# Patient Record
Sex: FEMALE | Race: WHITE | NOT HISPANIC OR LATINO | Employment: OTHER | URBAN - METROPOLITAN AREA
[De-identification: names, ages, dates, MRNs, and addresses within clinical notes are randomized per-mention and may not be internally consistent; named-entity substitution may affect disease eponyms.]

---

## 2018-02-04 ENCOUNTER — HOSPITAL ENCOUNTER (EMERGENCY)
Facility: HOSPITAL | Age: 38
Discharge: HOME | End: 2018-02-04
Attending: EMERGENCY MEDICINE | Admitting: EMERGENCY MEDICINE

## 2018-02-04 ENCOUNTER — APPOINTMENT (OUTPATIENT)
Dept: GENERAL RADIOLOGY | Facility: HOSPITAL | Age: 38
End: 2018-02-04

## 2018-02-04 VITALS
WEIGHT: 150 LBS | HEART RATE: 70 BPM | RESPIRATION RATE: 18 BRPM | DIASTOLIC BLOOD PRESSURE: 54 MMHG | TEMPERATURE: 98.4 F | BODY MASS INDEX: 24.99 KG/M2 | HEIGHT: 65 IN | SYSTOLIC BLOOD PRESSURE: 99 MMHG | OXYGEN SATURATION: 99 %

## 2018-02-04 DIAGNOSIS — M94.0 COSTOCHONDRITIS: Primary | ICD-10-CM

## 2018-02-04 LAB
ALBUMIN SERPL-MCNC: 4.1 G/DL (ref 3.5–5.2)
ALBUMIN/GLOB SERPL: 2.1 G/DL
ALP SERPL-CCNC: 40 U/L (ref 39–117)
ALT SERPL W P-5'-P-CCNC: 9 U/L (ref 1–33)
ANION GAP SERPL CALCULATED.3IONS-SCNC: 11.6 MMOL/L
AST SERPL-CCNC: 13 U/L (ref 1–32)
BASOPHILS # BLD AUTO: 0.02 10*3/MM3 (ref 0–0.2)
BASOPHILS NFR BLD AUTO: 0.3 % (ref 0–1.5)
BILIRUB SERPL-MCNC: 0.2 MG/DL (ref 0.1–1.2)
BUN BLD-MCNC: 17 MG/DL (ref 6–20)
BUN/CREAT SERPL: 21.8 (ref 7–25)
CALCIUM SPEC-SCNC: 8.8 MG/DL (ref 8.6–10.5)
CHLORIDE SERPL-SCNC: 107 MMOL/L (ref 98–107)
CO2 SERPL-SCNC: 23.4 MMOL/L (ref 22–29)
CREAT BLD-MCNC: 0.78 MG/DL (ref 0.57–1)
D DIMER PPP FEU-MCNC: <0.27 MCGFEU/ML (ref 0–0.49)
DEPRECATED RDW RBC AUTO: 42.9 FL (ref 37–54)
EOSINOPHIL # BLD AUTO: 0.22 10*3/MM3 (ref 0–0.7)
EOSINOPHIL NFR BLD AUTO: 3.1 % (ref 0.3–6.2)
ERYTHROCYTE [DISTWIDTH] IN BLOOD BY AUTOMATED COUNT: 13.7 % (ref 11.7–13)
GFR SERPL CREATININE-BSD FRML MDRD: 84 ML/MIN/1.73
GLOBULIN UR ELPH-MCNC: 2 GM/DL
GLUCOSE BLD-MCNC: 110 MG/DL (ref 65–99)
HCG SERPL QL: NEGATIVE
HCT VFR BLD AUTO: 36.6 % (ref 35.6–45.5)
HGB BLD-MCNC: 12 G/DL (ref 11.9–15.5)
IMM GRANULOCYTES # BLD: 0 10*3/MM3 (ref 0–0.03)
IMM GRANULOCYTES NFR BLD: 0 % (ref 0–0.5)
LYMPHOCYTES # BLD AUTO: 1.88 10*3/MM3 (ref 0.9–4.8)
LYMPHOCYTES NFR BLD AUTO: 26.2 % (ref 19.6–45.3)
MCH RBC QN AUTO: 28.2 PG (ref 26.9–32)
MCHC RBC AUTO-ENTMCNC: 32.8 G/DL (ref 32.4–36.3)
MCV RBC AUTO: 85.9 FL (ref 80.5–98.2)
MONOCYTES # BLD AUTO: 0.4 10*3/MM3 (ref 0.2–1.2)
MONOCYTES NFR BLD AUTO: 5.6 % (ref 5–12)
NEUTROPHILS # BLD AUTO: 4.66 10*3/MM3 (ref 1.9–8.1)
NEUTROPHILS NFR BLD AUTO: 64.8 % (ref 42.7–76)
PLATELET # BLD AUTO: 131 10*3/MM3 (ref 140–500)
PMV BLD AUTO: 11.7 FL (ref 6–12)
POTASSIUM BLD-SCNC: 3.8 MMOL/L (ref 3.5–5.2)
PROT SERPL-MCNC: 6.1 G/DL (ref 6–8.5)
RBC # BLD AUTO: 4.26 10*6/MM3 (ref 3.9–5.2)
SODIUM BLD-SCNC: 142 MMOL/L (ref 136–145)
TROPONIN T SERPL-MCNC: <0.01 NG/ML (ref 0–0.03)
WBC NRBC COR # BLD: 7.18 10*3/MM3 (ref 4.5–10.7)

## 2018-02-04 PROCEDURE — 93010 ELECTROCARDIOGRAM REPORT: CPT | Performed by: INTERNAL MEDICINE

## 2018-02-04 PROCEDURE — 84484 ASSAY OF TROPONIN QUANT: CPT | Performed by: EMERGENCY MEDICINE

## 2018-02-04 PROCEDURE — 71046 X-RAY EXAM CHEST 2 VIEWS: CPT

## 2018-02-04 PROCEDURE — 93005 ELECTROCARDIOGRAM TRACING: CPT | Performed by: EMERGENCY MEDICINE

## 2018-02-04 PROCEDURE — 36415 COLL VENOUS BLD VENIPUNCTURE: CPT

## 2018-02-04 PROCEDURE — 80053 COMPREHEN METABOLIC PANEL: CPT | Performed by: EMERGENCY MEDICINE

## 2018-02-04 PROCEDURE — 99283 EMERGENCY DEPT VISIT LOW MDM: CPT

## 2018-02-04 PROCEDURE — 85025 COMPLETE CBC W/AUTO DIFF WBC: CPT | Performed by: EMERGENCY MEDICINE

## 2018-02-04 PROCEDURE — 84703 CHORIONIC GONADOTROPIN ASSAY: CPT | Performed by: EMERGENCY MEDICINE

## 2018-02-04 PROCEDURE — 85379 FIBRIN DEGRADATION QUANT: CPT | Performed by: EMERGENCY MEDICINE

## 2018-02-04 RX ORDER — METHYLPREDNISOLONE 4 MG/1
TABLET ORAL
Qty: 21 EACH | Refills: 0 | Status: SHIPPED | OUTPATIENT
Start: 2018-02-04 | End: 2018-04-24

## 2018-02-04 NOTE — DISCHARGE INSTRUCTIONS
Costochondritis  Costochondritis is swelling and irritation (inflammation) of the tissue (cartilage) that connects your ribs to your breastbone (sternum). This causes pain in the front of your chest. The pain usually starts gradually and involves more than one rib.  What are the causes?  The exact cause of this condition is not always known. It results from stress on the cartilage where your ribs attach to your sternum. The cause of this stress could be:  · Chest injury (trauma).  · Exercise or activity, such as lifting.  · Severe coughing.  What increases the risk?  You may be at higher risk for this condition if you:  · Are female.  · Are 30?40 years old.  · Recently started a new exercise or work activity.  · Have low levels of vitamin D.  · Have a condition that makes you cough frequently.  What are the signs or symptoms?  The main symptom of this condition is chest pain. The pain:  · Usually starts gradually and can be sharp or dull.  · Gets worse with deep breathing, coughing, or exercise.  · Gets better with rest.  · May be worse when you press on the sternum-rib connection (tenderness).  How is this diagnosed?  This condition is diagnosed based on your symptoms, medical history, and a physical exam. Your health care provider will check for tenderness when pressing on your sternum. This is the most important finding. You may also have tests to rule out other causes of chest pain. These may include:  · A chest X-ray to check for lung problems.  · An electrocardiogram (ECG) to see if you have a heart problem that could be causing the pain.  · An imaging scan to rule out a chest or rib fracture.  How is this treated?  This condition usually goes away on its own over time. Your health care provider may prescribe an NSAID to reduce pain and inflammation. Your health care provider may also suggest that you:  · Rest and avoid activities that make pain worse.  · Apply heat or cold to the area to reduce pain and  inflammation.  · Do exercises to stretch your chest muscles.  If these treatments do not help, your health care provider may inject a numbing medicine at the sternum-rib connection to help relieve the pain.  Follow these instructions at home:  · Avoid activities that make pain worse. This includes any activities that use chest, abdominal, and side muscles.  · If directed, put ice on the painful area:  ¨ Put ice in a plastic bag.  ¨ Place a towel between your skin and the bag.  ¨ Leave the ice on for 20 minutes, 2-3 times a day.  · If directed, apply heat to the affected area as often as told by your health care provider. Use the heat source that your health care provider recommends, such as a moist heat pack or a heating pad.  ¨ Place a towel between your skin and the heat source.  ¨ Leave the heat on for 20-30 minutes.  ¨ Remove the heat if your skin turns bright red. This is especially important if you are unable to feel pain, heat, or cold. You may have a greater risk of getting burned.  · Take over-the-counter and prescription medicines only as told by your health care provider.  · Return to your normal activities as told by your health care provider. Ask your health care provider what activities are safe for you.  · Keep all follow-up visits as told by your health care provider. This is important.  Contact a health care provider if:  · You have chills or a fever.  · Your pain does not go away or it gets worse.  · You have a cough that does not go away (is persistent).  Get help right away if:  · You have shortness of breath.  This information is not intended to replace advice given to you by your health care provider. Make sure you discuss any questions you have with your health care provider.  Document Released: 09/27/2006 Document Revised: 07/07/2017 Document Reviewed: 04/12/2017  Else"[employer] Interactive Patient Education © 2017 [employer].

## 2018-02-04 NOTE — ED PROVIDER NOTES
" EMERGENCY DEPARTMENT ENCOUNTER    CHIEF COMPLAINT  Chief Complaint: CP, SOA  History given by: patient  History limited by: nothing  Room Number: 51/51  PMD: No Known Provider      HPI:  Pt is a 37 y.o. female who presents complaining of left sided CP that began last night and became worse today. Pt states that the pain began to radiate to her back PTA. Pt states that her pain is worse with certain movements, lifting heavy items and deep breathing. Pt also complains of mild SOA but denies recent illness, recent injury, numbness or tingling. Pt states that she has three children under four and that she holds her children with her left arm. Pt states that she took ibuprofen without relief     Duration:  One day  Onset: gradual  Timing: constant  Location: left anterior chest  Radiation: back  Quality: \"pain\"  Intensity/Severity: moderate  Progression: worsening  Associated Symptoms: SOA  Aggravating Factors: movement, lifting heavy items and deep breathing  Alleviating Factors: none  Previous Episodes: Pt denies having similar symptoms previously.  Treatment before arrival: Pt states that she took ibuprofen without relief     PAST MEDICAL HISTORY  Active Ambulatory Problems     Diagnosis Date Noted   • No Active Ambulatory Problems     Resolved Ambulatory Problems     Diagnosis Date Noted   • Term pregnancy 10/13/2016     Past Medical History:   Diagnosis Date   • Colon polyps    • Migraine        PAST SURGICAL HISTORY  Past Surgical History:   Procedure Laterality Date   • COLONOSCOPY W/ POLYPECTOMY     • VAGINAL DELIVERY         FAMILY HISTORY  Family History   Problem Relation Age of Onset   • Migraines Mother    • Aneurysm Maternal Grandmother    • Heart attack Maternal Grandfather    • Hypertension Maternal Grandfather    • No Known Problems Father    • No Known Problems Brother    • Colon cancer Paternal Grandfather    • Breast cancer Paternal Grandmother        SOCIAL HISTORY  Social History     Social History "   • Marital status:      Spouse name: N/A   • Number of children: N/A   • Years of education: N/A     Occupational History   • Not on file.     Social History Main Topics   • Smoking status: Never Smoker   • Smokeless tobacco: Never Used   • Alcohol use No   • Drug use: No   • Sexual activity: Yes     Partners: Male     Other Topics Concern   • Not on file     Social History Narrative       ALLERGIES  Penicillins    REVIEW OF SYSTEMS  Review of Systems   Constitutional: Negative for fever.   HENT: Negative for sore throat.    Eyes: Negative.    Respiratory: Positive for shortness of breath (mild). Negative for cough.    Cardiovascular: Positive for chest pain (left anterior chest wall).   Gastrointestinal: Negative for abdominal pain, diarrhea and vomiting.   Genitourinary: Negative for dysuria.   Musculoskeletal: Negative for neck pain.   Skin: Negative for rash.   Allergic/Immunologic: Negative.    Neurological: Negative for weakness, numbness and headaches.   Hematological: Negative.    Psychiatric/Behavioral: Negative.    All other systems reviewed and are negative.      PHYSICAL EXAM  ED Triage Vitals   Temp Heart Rate Resp BP SpO2   02/04/18 1603 02/04/18 1603 02/04/18 1603 -- 02/04/18 1603   98.4 °F (36.9 °C) 74 18  100 %      Temp src Heart Rate Source Patient Position BP Location FiO2 (%)   -- -- -- -- --              Physical Exam   Constitutional: She is oriented to person, place, and time and well-developed, well-nourished, and in no distress. No distress.   HENT:   Head: Normocephalic and atraumatic.   Eyes: EOM are normal. Pupils are equal, round, and reactive to light.   Neck: Normal range of motion. Neck supple.   Cardiovascular: Normal rate, regular rhythm and normal heart sounds.    Pulmonary/Chest: Effort normal and breath sounds normal. No respiratory distress. She exhibits tenderness (along left costochondral margin ).   Pt's pain is reproducible with movement of the left arm   Abdominal:  Soft. There is no tenderness. There is no rebound and no guarding.   Musculoskeletal: Normal range of motion. She exhibits no edema.   Neurological: She is alert and oriented to person, place, and time. She has normal sensation and normal strength.   Skin: Skin is warm and dry. No rash noted.   Psychiatric: Mood and affect normal.   Nursing note and vitals reviewed.      LAB RESULTS  Lab Results (last 24 hours)     Procedure Component Value Units Date/Time    CBC & Differential [[id number]] Collected:  02/04/18 1647    Specimen:  Blood Updated:  02/04/18 1701    Narrative:       The following orders were created for panel order CBC & Differential.  Procedure                               Abnormality         Status                     ---------                               -----------         ------                     CBC Auto Differential[[id number]]         Abnormal            Final result                 Please view results for these tests on the individual orders.    Comprehensive Metabolic Panel [[id number]]  (Abnormal) Collected:  02/04/18 1647    Specimen:  Blood Updated:  02/04/18 1722     Glucose 110 (H) mg/dL      BUN 17 mg/dL      Creatinine 0.78 mg/dL      Sodium 142 mmol/L      Potassium 3.8 mmol/L      Chloride 107 mmol/L      CO2 23.4 mmol/L      Calcium 8.8 mg/dL      Total Protein 6.1 g/dL      Albumin 4.10 g/dL      ALT (SGPT) 9 U/L      AST (SGOT) 13 U/L      Alkaline Phosphatase 40 U/L      Total Bilirubin 0.2 mg/dL      eGFR Non African Amer 84 mL/min/1.73      Globulin 2.0 gm/dL      A/G Ratio 2.1 g/dL      BUN/Creatinine Ratio 21.8     Anion Gap 11.6 mmol/L     Troponin [[id number]]  (Normal) Collected:  02/04/18 1647    Specimen:  Blood Updated:  02/04/18 1722     Troponin T <0.010 ng/mL     Narrative:       Troponin T Reference Ranges:  Less than 0.03 ng/mL:    Negative for AMI  0.03 to 0.09 ng/mL:      Indeterminant for AMI  Greater than 0.09 ng/mL: Positive for AMI    D-dimer, Quantitative  [[id number]]  (Normal) Collected:  02/04/18 1647    Specimen:  Blood Updated:  02/04/18 1715     D-Dimer, Quantitative <0.27 MCGFEU/mL     Narrative:       The Stago D-Dimer test used in conjunction with a clinical pretest probability (PTP) assessment model, has been approved by the FDA to rule out the presence of venous thromboembolism (VTE) in outpatients suspected of deep venous thrombosis (DVT) or pulmonary embolism (PE).     hCG, Serum, Qualitative [[id number]]  (Normal) Collected:  02/04/18 1647    Specimen:  Blood Updated:  02/04/18 1727     HCG Qualitative Negative    CBC Auto Differential [[id number]]  (Abnormal) Collected:  02/04/18 1647    Specimen:  Blood Updated:  02/04/18 1701     WBC 7.18 10*3/mm3      RBC 4.26 10*6/mm3      Hemoglobin 12.0 g/dL      Hematocrit 36.6 %      MCV 85.9 fL      MCH 28.2 pg      MCHC 32.8 g/dL      RDW 13.7 (H) %      RDW-SD 42.9 fl      MPV 11.7 fL      Platelets 131 (L) 10*3/mm3      Neutrophil % 64.8 %      Lymphocyte % 26.2 %      Monocyte % 5.6 %      Eosinophil % 3.1 %      Basophil % 0.3 %      Immature Grans % 0.0 %      Neutrophils, Absolute 4.66 10*3/mm3      Lymphocytes, Absolute 1.88 10*3/mm3      Monocytes, Absolute 0.40 10*3/mm3      Eosinophils, Absolute 0.22 10*3/mm3      Basophils, Absolute 0.02 10*3/mm3      Immature Grans, Absolute 0.00 10*3/mm3           I ordered the above labs and reviewed the results    RADIOLOGY  XR Chest 2 View   Final Result   No focal pulmonary consolidation. Follow-up as clinical   indications persist.       This report was finalized on 2/4/2018 5:09 PM by Dr. En Bermudez MD.               I ordered the above noted radiological studies. Interpreted by radiologist. Reviewed by me in PACS.       PROCEDURES  Procedures  EKG           EKG time: 1635  Rhythm/Rate: NSR, 56  P waves and IL: normal  QRS, axis: normal   ST and T waves: normal     Interpreted Contemporaneously by me, independently viewed  No prior for  comparison    PROGRESS AND CONSULTS  ED Course     1637- Ordered blood work, hCG, D-Dimer, troponin, EKG and CXR for further evaluation.     1733- Notified pt of her unremarkable lab and imaging results, including her negative troponin and D-Dimer. D/w pt that her symptoms are c/w costochondritis and do not appear to be cardiac in etiology or secondary to a P.E.. D/w pt that I do not believe that she has shingles since she does not have a rash but that she needs to follow up with her PMD if she develops a rash. Discussed the plan to discharge the pt home with instructions to use heat as needed and with a prescription for Medrol DosePak. Pt understands and agrees with the plan, all questions answered.    MEDICAL DECISION MAKING  Results were reviewed/discussed with the patient and they were also made aware of online access. Pt also made aware that some labs, such as cultures, will not be resulted during ER visit and follow up with PMD is necessary.     MDM  Number of Diagnoses or Management Options  Costochondritis:      Amount and/or Complexity of Data Reviewed  Clinical lab tests: ordered and reviewed (Troponin=<0.010, D-Dimer=<0.27)  Tests in the radiology section of CPT®: ordered and reviewed (CXR shows nothing acute)  Tests in the medicine section of CPT®: ordered and reviewed (See EKG procedure note)  Independent visualization of images, tracings, or specimens: yes    Patient Progress  Patient progress: stable         DIAGNOSIS  Final diagnoses:   Costochondritis       DISPOSITION  DISCHARGE    Patient discharged in stable condition.    Reviewed implications of results, diagnosis, meds, responsibility to follow up, warning signs and symptoms of possible worsening, potential complications and reasons to return to ER, including fever, worsening pain or any concerns.    Patient/Family voiced understanding of above instructions.    Discussed plan for discharge, as there is no emergent indication for admission.   Pt/family is agreeable and understands need for follow up and repeat testing.  Pt is aware that discharge does not mean that nothing is wrong but it indicates no emergency is present that requires admission and they must continue care with follow-up as given below or physician of their choice.     FOLLOW-UP  [facility] PHYSIC REFERRAL SERVICE  [facility] [address]  [phone]  Schedule an appointment as soon as possible for a visit           Medication List      New Prescriptions          MethylPREDNISolone 4 MG tablet   Commonly known as:  MEDROL (LUIS M)   Take as directed on package instructions.               Latest Documented Vital Signs:  As of 5:39 PM  BP- 102/88 HR- 78 Temp- 98.4 °F (36.9 °C) O2 sat- 96%    --  Documentation assistance provided by floresita Terrazas for Dr. Barrera.  Information recorded by the scribe was done at my direction and has been verified and validated by me.     Juju Terrazas  02/04/18 1739       Naren Barrera MD  02/04/18 7720

## 2018-02-05 ENCOUNTER — TELEPHONE (OUTPATIENT)
Dept: SOCIAL WORK | Facility: HOSPITAL | Age: 38
End: 2018-02-05

## 2018-02-06 ENCOUNTER — TELEPHONE (OUTPATIENT)
Dept: SOCIAL WORK | Facility: HOSPITAL | Age: 38
End: 2018-02-06

## 2018-04-24 ENCOUNTER — APPOINTMENT (OUTPATIENT)
Dept: GENERAL RADIOLOGY | Facility: HOSPITAL | Age: 38
End: 2018-04-24

## 2018-04-24 PROCEDURE — 71046 X-RAY EXAM CHEST 2 VIEWS: CPT | Performed by: FAMILY MEDICINE

## 2019-05-30 ENCOUNTER — OFFICE VISIT (OUTPATIENT)
Dept: CARDIOLOGY | Facility: CLINIC | Age: 39
End: 2019-05-30

## 2019-05-30 VITALS
WEIGHT: 146.8 LBS | HEART RATE: 66 BPM | SYSTOLIC BLOOD PRESSURE: 106 MMHG | BODY MASS INDEX: 24.46 KG/M2 | HEIGHT: 65 IN | DIASTOLIC BLOOD PRESSURE: 60 MMHG

## 2019-05-30 DIAGNOSIS — R00.2 PALPITATIONS: Primary | ICD-10-CM

## 2019-05-30 DIAGNOSIS — Z87.898 HISTORY OF SYNCOPE: ICD-10-CM

## 2019-05-30 DIAGNOSIS — R06.02 SHORTNESS OF BREATH: ICD-10-CM

## 2019-05-30 PROCEDURE — 99204 OFFICE O/P NEW MOD 45 MIN: CPT | Performed by: INTERNAL MEDICINE

## 2019-05-30 PROCEDURE — 93000 ELECTROCARDIOGRAM COMPLETE: CPT | Performed by: INTERNAL MEDICINE

## 2019-05-30 RX ORDER — CETIRIZINE HYDROCHLORIDE 10 MG/1
10 TABLET ORAL DAILY
COMMUNITY

## 2019-05-31 NOTE — PROGRESS NOTES
Date of Office Visit: 2019  Encounter Provider: Kelvin Feliz MD  Place of Service: [facility] CARDIOLOGY  Patient Name: Heidi Sawant  :1980    Chief Complaint   Patient presents with   • Palpitations   :     HPI: Heidi Sawant is a 39 y.o. female who presents today to be seen for some concerning symptoms.  She is extremely healthy and active.  She has three young children; her pregnancies were uncomplicated.  She exercises regularly.  She doesn't drink much alcohol.  She does drink a significant amount of coffee every day.  She denies any family of heart disease.    In college, she had a few episodes of syncope.  She would get lightheaded and warm and would then lose consciousness for a few seconds; she would be completely fine afterwards.  These resolved.    Last week, she was extremely busy.  She went to [employer] two days in a row.  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  She endorses that she drank coffee that day and not much water.  Then she went to a party for a friend and had two drinks, which she doesn't too very frequently.      That night, she woke up lightheaded and her heart was beating fast and irregularly.  She got up to go to the bathroom and was very nauseated and she was pale when she looked in the mirror.  It eventually passed.  Since then she has had some occasional palpitations and some episodes of lightheadedness.  Yesterday, she ate peanut butter (which is very common for her) and developed some tongue swelling, throat/tongue tingling, and a slight pressure in her chest with dyspnea . She took diphenhydramine and it passed.    She has not had any change in her exercise tolerance or any worrisome symptoms during exercise.    Past Medical History:   Diagnosis Date   • Colon polyps    • Migraine        Past Surgical History:   Procedure Laterality Date   • COLONOSCOPY W/ POLYPECTOMY     •  VAGINAL DELIVERY         Social History     Socioeconomic History   • Marital status:      Spouse name: Not on file   • Number of children: Not on file   • Years of education: Not on file   • Highest education level: Not on file   Tobacco Use   • Smoking status: Never Smoker   • Smokeless tobacco: Never Used   Substance and Sexual Activity   • Alcohol use: Yes     Comment: social   • Drug use: No   • Sexual activity: Yes     Partners: Male       Family History   Problem Relation Age of Onset   • Migraines Mother    • Aneurysm Maternal Grandmother    • Heart attack Maternal Grandfather    • Hypertension Maternal Grandfather    • No Known Problems Father    • No Known Problems Brother    • Colon cancer Paternal Grandfather    • Breast cancer Paternal Grandmother        Review of Systems   Cardiovascular: Positive for palpitations.   Respiratory: Positive for shortness of breath.    Neurological: Positive for light-headedness.   All other systems reviewed and are negative.      Allergies   Allergen Reactions   • Penicillins Other (See Comments)     Father and grandmother allergic, patient passed out from penicillin injection         Current Outpatient Medications:   •  cetirizine (zyrTEC) 10 MG tablet, Take 10 mg by mouth Daily., Disp: , Rfl:   •  ibuprofen (ADVIL,MOTRIN) 800 MG tablet, Take 1 tablet by mouth Every 8 (Eight) Hours As Needed for mild pain (1-3)., Disp: 30 tablet, Rfl: 0  •  Multiple Vitamin (MULTI VITAMIN DAILY PO), Take 1 tablet by mouth Daily., Disp: , Rfl:   •  azithromycin (ZITHROMAX) 250 MG tablet, Take 2 tablets the first day, then 1 tablet daily for 4 days., Disp: 6 tablet, Rfl: 0  •  calcium carbonate (TUMS) 500 MG chewable tablet, Chew 1 tablet daily., Disp: , Rfl:   •  ondansetron (ZOFRAN) 8 MG tablet, Take 1 tablet by mouth Every 8 (Eight) Hours As Needed for Nausea or Vomiting., Disp: 10 tablet, Rfl: 0  •  Prenatal Vit-Fe Fumarate-FA (PRENATAL, CLASSIC, VITAMIN) 28-0.8 MG tablet  "tablet, Take  by mouth daily., Disp: , Rfl:   •  ranitidine (ZANTAC) 75 MG tablet, Take 75 mg by mouth Every Night., Disp: , Rfl:       Objective:     Vitals:    05/30/19 1016   BP: 106/60   BP Location: Right arm   Patient Position: Sitting   Pulse: 66   Weight: 66.6 kg (146 lb 12.8 oz)   Height: 165.1 cm (65\")     Body mass index is 24.43 kg/m².    Physical Exam   Constitutional: She is oriented to person, place, and time. She appears well-developed and well-nourished.   HENT:   Head: Normocephalic.   Nose: Nose normal.   Mouth/Throat: Oropharynx is clear and moist.   Eyes: Conjunctivae and EOM are normal. Pupils are equal, round, and reactive to light.   Neck: Normal range of motion. No JVD present.   Cardiovascular: Normal rate, regular rhythm, normal heart sounds and intact distal pulses.  Occasional extrasystoles are present.   No murmur heard.  Pulmonary/Chest: Effort normal and breath sounds normal.   Abdominal: Soft. She exhibits no mass. There is no tenderness.   Musculoskeletal: Normal range of motion. She exhibits no edema.   Lymphadenopathy:     She has no cervical adenopathy.   Neurological: She is alert and oriented to person, place, and time. No cranial nerve deficit.   Skin: Skin is warm and dry. No rash noted.   Psychiatric: She has a normal mood and affect. Her behavior is normal. Judgment and thought content normal.   Vitals reviewed.        ECG 12 Lead  Date/Time: 5/30/2019 10:00 AM  Performed by: Kelvin Feliz MD  Authorized by: Kelvin Feliz MD   Comparison: not compared with previous ECG   Previous ECG: no previous ECG available  Rhythm: sinus arrhythmia  Conduction: conduction normal  ST Segments: ST segments normal  T Waves: T waves normal  Other: no other findings    Clinical impression: normal ECG              Assessment:       Diagnosis Plan   1. Palpitations  Adult Transthoracic Echo Complete W/ Cont if Necessary Per Protocol   2. History of syncope  Adult Transthoracic Echo Complete " W/ Cont if Necessary Per Protocol   3. Shortness of breath  Adult Transthoracic Echo Complete W/ Cont if Necessary Per Protocol          Plan:       Mrs Sawant had a few episodes of what was surely vasovagal syncope in college.  I suspect that the event she had the other night was also vagal (nausea, pallor, lightheadedness) and brought on by an extremely busy few days and dehydration.  While most vagal events are associated with bradycardia, some can be associated with tachycardias (particularly atrial fibrillation).      For now, I'm going to check an echo and I've asked her to focus on a little more self-care (less caffeine, more water).  I encouraged her to look into the [employer] liz for her iPhone so she can catch strips of her rhythm if this happens again.    She is in sinus arrhythmia on her EKG, but her exam seems more pronounced that that and I wonder if she was having some ectopy.    She has no exertional dyspnea, but had some dyspnea yesterday after eating peanut butter, along with tongue/lip/throat tingling.  Could this be an allergy?  I think she should see an allergist for testing.  She has an Epi-Pen as her children have allergies.     Sincerely,       Kelvin Feliz MD

## 2019-06-17 ENCOUNTER — OFFICE VISIT (OUTPATIENT)
Dept: NEUROLOGY | Facility: CLINIC | Age: 39
End: 2019-06-17

## 2019-06-17 ENCOUNTER — HOSPITAL ENCOUNTER (OUTPATIENT)
Dept: CARDIOLOGY | Facility: HOSPITAL | Age: 39
Discharge: HOME | End: 2019-06-17
Admitting: INTERNAL MEDICINE

## 2019-06-17 VITALS
WEIGHT: 147 LBS | BODY MASS INDEX: 24.49 KG/M2 | HEIGHT: 65 IN | SYSTOLIC BLOOD PRESSURE: 105 MMHG | HEART RATE: 79 BPM | DIASTOLIC BLOOD PRESSURE: 60 MMHG | OXYGEN SATURATION: 100 %

## 2019-06-17 VITALS
WEIGHT: 146 LBS | OXYGEN SATURATION: 99 % | HEART RATE: 76 BPM | HEIGHT: 65 IN | BODY MASS INDEX: 24.32 KG/M2 | DIASTOLIC BLOOD PRESSURE: 60 MMHG | SYSTOLIC BLOOD PRESSURE: 100 MMHG

## 2019-06-17 DIAGNOSIS — Z87.898 HISTORY OF SYNCOPE: ICD-10-CM

## 2019-06-17 DIAGNOSIS — R06.02 SHORTNESS OF BREATH: ICD-10-CM

## 2019-06-17 DIAGNOSIS — R51.9 INTRACTABLE EPISODIC HEADACHE, UNSPECIFIED HEADACHE TYPE: Primary | ICD-10-CM

## 2019-06-17 DIAGNOSIS — R00.2 PALPITATIONS: ICD-10-CM

## 2019-06-17 LAB
AORTIC ARCH: 2.3 CM
AORTIC DIMENSIONLESS INDEX: 0.7 (DI)
AORTIC ROOT ANNULUS: 1.9 CM
ASCENDING AORTA: 3.3 CM
BH CV ECHO MEAS - ACS: 2 CM
BH CV ECHO MEAS - AO MAX PG (FULL): 2.5 MMHG
BH CV ECHO MEAS - AO MAX PG: 6.9 MMHG
BH CV ECHO MEAS - AO MEAN PG (FULL): 2 MMHG
BH CV ECHO MEAS - AO MEAN PG: 4 MMHG
BH CV ECHO MEAS - AO ROOT AREA: 2.8 CM^2
BH CV ECHO MEAS - AO V2 MAX: 131 CM/SEC
BH CV ECHO MEAS - AO V2 MEAN: 91.5 CM/SEC
BH CV ECHO MEAS - AO V2 VTI: 27.9 CM
BH CV ECHO MEAS - AVA(I,A): 2.1 CM^2
BH CV ECHO MEAS - AVA(I,D): 2.1 CM^2
BH CV ECHO MEAS - AVA(V,A): 2.3 CM^2
BH CV ECHO MEAS - AVA(V,D): 2.3 CM^2
BH CV ECHO MEAS - BSA(HAYCOCK): 1.8 M^2
BH CV ECHO MEAS - BSA: 1.7 M^2
BH CV ECHO MEAS - BZI_BMI: 24.3 KILOGRAMS/M^2
BH CV ECHO MEAS - BZI_METRIC_HEIGHT: 165.1 CM
BH CV ECHO MEAS - BZI_METRIC_WEIGHT: 66.2 KG
BH CV ECHO MEAS - EDV(MOD-SP2): 90 ML
BH CV ECHO MEAS - EDV(MOD-SP4): 70 ML
BH CV ECHO MEAS - EDV(TEICH): 91 ML
BH CV ECHO MEAS - EF(CUBED): 59.8 %
BH CV ECHO MEAS - EF(MOD-BP): 62 %
BH CV ECHO MEAS - EF(MOD-SP2): 63.3 %
BH CV ECHO MEAS - EF(MOD-SP4): 58.6 %
BH CV ECHO MEAS - EF(TEICH): 51.5 %
BH CV ECHO MEAS - ESV(MOD-SP2): 33 ML
BH CV ECHO MEAS - ESV(MOD-SP4): 29 ML
BH CV ECHO MEAS - ESV(TEICH): 44.1 ML
BH CV ECHO MEAS - FS: 26.2 %
BH CV ECHO MEAS - IVS/LVPW: 0.89
BH CV ECHO MEAS - IVSD: 0.88 CM
BH CV ECHO MEAS - LAT PEAK E' VEL: 13 CM/SEC
BH CV ECHO MEAS - LV DIASTOLIC VOL/BSA (35-75): 40.5 ML/M^2
BH CV ECHO MEAS - LV MASS(C)D: 138.3 GRAMS
BH CV ECHO MEAS - LV MASS(C)DI: 79.9 GRAMS/M^2
BH CV ECHO MEAS - LV MAX PG: 4.4 MMHG
BH CV ECHO MEAS - LV MEAN PG: 2 MMHG
BH CV ECHO MEAS - LV SYSTOLIC VOL/BSA (12-30): 16.8 ML/M^2
BH CV ECHO MEAS - LV V1 MAX: 105 CM/SEC
BH CV ECHO MEAS - LV V1 MEAN: 61.8 CM/SEC
BH CV ECHO MEAS - LV V1 VTI: 20.4 CM
BH CV ECHO MEAS - LVIDD: 4.5 CM
BH CV ECHO MEAS - LVIDS: 3.3 CM
BH CV ECHO MEAS - LVLD AP2: 8.1 CM
BH CV ECHO MEAS - LVLD AP4: 7.1 CM
BH CV ECHO MEAS - LVLS AP2: 6.5 CM
BH CV ECHO MEAS - LVLS AP4: 5.9 CM
BH CV ECHO MEAS - LVOT AREA (M): 2.8 CM^2
BH CV ECHO MEAS - LVOT AREA: 2.8 CM^2
BH CV ECHO MEAS - LVOT DIAM: 1.9 CM
BH CV ECHO MEAS - LVPWD: 0.99 CM
BH CV ECHO MEAS - MED PEAK E' VEL: 9 CM/SEC
BH CV ECHO MEAS - MV A DUR: 0.09 SEC
BH CV ECHO MEAS - MV A MAX VEL: 57.8 CM/SEC
BH CV ECHO MEAS - MV DEC SLOPE: 371 CM/SEC^2
BH CV ECHO MEAS - MV DEC TIME: 0.18 SEC
BH CV ECHO MEAS - MV E MAX VEL: 87.4 CM/SEC
BH CV ECHO MEAS - MV E/A: 1.5
BH CV ECHO MEAS - MV MAX PG: 3.2 MMHG
BH CV ECHO MEAS - MV MEAN PG: 2 MMHG
BH CV ECHO MEAS - MV P1/2T MAX VEL: 89.2 CM/SEC
BH CV ECHO MEAS - MV P1/2T: 70.4 MSEC
BH CV ECHO MEAS - MV V2 MAX: 89.9 CM/SEC
BH CV ECHO MEAS - MV V2 MEAN: 62.7 CM/SEC
BH CV ECHO MEAS - MV V2 VTI: 29.2 CM
BH CV ECHO MEAS - MVA P1/2T LCG: 2.5 CM^2
BH CV ECHO MEAS - MVA(P1/2T): 3.1 CM^2
BH CV ECHO MEAS - MVA(VTI): 2 CM^2
BH CV ECHO MEAS - PA MAX PG (FULL): 1 MMHG
BH CV ECHO MEAS - PA MAX PG: 3.7 MMHG
BH CV ECHO MEAS - PA V2 MAX: 96.4 CM/SEC
BH CV ECHO MEAS - PULM A REVS DUR: 0.07 SEC
BH CV ECHO MEAS - PULM A REVS VEL: 31.6 CM/SEC
BH CV ECHO MEAS - PULM DIAS VEL: 50.8 CM/SEC
BH CV ECHO MEAS - PULM S/D: 0.87
BH CV ECHO MEAS - PULM SYS VEL: 44.4 CM/SEC
BH CV ECHO MEAS - RV MAX PG: 2.7 MMHG
BH CV ECHO MEAS - RV MEAN PG: 2 MMHG
BH CV ECHO MEAS - RV V1 MAX: 82 CM/SEC
BH CV ECHO MEAS - RV V1 MEAN: 61.3 CM/SEC
BH CV ECHO MEAS - RV V1 VTI: 19.1 CM
BH CV ECHO MEAS - SI(AO): 45.7 ML/M^2
BH CV ECHO MEAS - SI(CUBED): 30.8 ML/M^2
BH CV ECHO MEAS - SI(LVOT): 33.4 ML/M^2
BH CV ECHO MEAS - SI(MOD-SP2): 32.9 ML/M^2
BH CV ECHO MEAS - SI(MOD-SP4): 23.7 ML/M^2
BH CV ECHO MEAS - SI(TEICH): 27.1 ML/M^2
BH CV ECHO MEAS - SUP REN AO DIAM: 2.29 CM
BH CV ECHO MEAS - SV(AO): 79.1 ML
BH CV ECHO MEAS - SV(CUBED): 53.4 ML
BH CV ECHO MEAS - SV(LVOT): 57.8 ML
BH CV ECHO MEAS - SV(MOD-SP2): 57 ML
BH CV ECHO MEAS - SV(MOD-SP4): 41 ML
BH CV ECHO MEAS - SV(TEICH): 46.9 ML
BH CV ECHO MEAS - TAPSE (>1.6): 2 CM2
BH CV ECHO MEASUREMENTS AVERAGE E/E' RATIO: 7.95
BH CV VAS BP RIGHT ARM: NORMAL MMHG
BH CV XLRA - RV BASE: 2.65 CM
BH CV XLRA - TDI S': 13 CM/SEC
LEFT ATRIUM VOLUME INDEX: 28 ML/M2
LV EF 2D ECHO EST: 62 %
SINUS: 3.1 CM
STJ: 3 CM

## 2019-06-17 PROCEDURE — 93306 TTE W/DOPPLER COMPLETE: CPT

## 2019-06-17 PROCEDURE — 99204 OFFICE O/P NEW MOD 45 MIN: CPT | Performed by: PSYCHIATRY & NEUROLOGY

## 2019-06-17 PROCEDURE — 93306 TTE W/DOPPLER COMPLETE: CPT | Performed by: INTERNAL MEDICINE

## 2019-06-17 RX ORDER — AMITRIPTYLINE HYDROCHLORIDE 10 MG/1
10 TABLET, FILM COATED ORAL NIGHTLY
COMMUNITY
End: 2019-06-17 | Stop reason: SDUPTHER

## 2019-06-17 RX ORDER — AMITRIPTYLINE HYDROCHLORIDE 10 MG/1
10 TABLET, FILM COATED ORAL NIGHTLY
Qty: 30 TABLET | Refills: 5 | Status: SHIPPED | OUTPATIENT
Start: 2019-06-17

## 2019-06-17 NOTE — PROGRESS NOTES
Subjective:     Patient ID: Heidi Sawant is a 39 y.o. female.    History of Present Illness    The patient is a 39-year-old right-handed young lady who was seen for further evaluation of weeks.  The patient was seen today in consultation per the request of Dr. Pickard.  History was also taken from the patient's friend.  The patient has had headaches that have been fairly constant since about May 24.  It was at night she woke up with sweaty had nausea felt her heart was irregular her  who is a vascular surgeon had a place her face and some ice and the problem did improve since that time she is felt a bit off and had headaches that are there 24/7 they are mainly over the posterior vertex region and at times in her neck.  She was given amitriptyline 10 mg on 2 occasions which helped the headaches but she felt somewhat foggy on the medication.  She has had some visual discolorations as well.  She is light sensitive with her headaches but not noise she has had some nausea with them as well      The following portions of the patient's history were reviewed and updated as appropriate: allergies, current medications, past family history, past medical history, past social history, past surgical history and problem list.      Family History   Problem Relation Age of Onset   • Migraines Mother    • Aneurysm Maternal Grandmother    • Heart attack Maternal Grandfather    • Hypertension Maternal Grandfather    • No Known Problems Father    • No Known Problems Brother    • Colon cancer Paternal Grandfather    • Breast cancer Paternal Grandmother        Past Medical History:   Diagnosis Date   • Colon polyps    • Migraine        Social History     Socioeconomic History   • Marital status:      Spouse name: Not on file   • Number of children: Not on file   • Years of education: Not on file   • Highest education level: Not on file   Tobacco Use   • Smoking status: Never Smoker   • Smokeless tobacco: Never Used    Substance and Sexual Activity   • Alcohol use: Yes     Comment: social   • Drug use: No   • Sexual activity: Yes     Partners: Male         Current Outpatient Medications:   •  amitriptyline (ELAVIL) 10 MG tablet, Take 1 tablet by mouth Every Night., Disp: 30 tablet, Rfl: 5  •  cetirizine (zyrTEC) 10 MG tablet, Take 10 mg by mouth Daily., Disp: , Rfl:   •  Multiple Vitamin (MULTI VITAMIN DAILY PO), Take 1 tablet by mouth Daily., Disp: , Rfl:     Review of Systems   Constitutional: Negative.    HENT: Positive for sore throat and tinnitus. Negative for congestion, dental problem, drooling, ear discharge, ear pain, facial swelling, hearing loss, mouth sores, nosebleeds, postnasal drip, rhinorrhea, sinus pressure, sinus pain, sneezing, trouble swallowing and voice change.    Eyes: Positive for photophobia, itching and visual disturbance. Negative for pain, discharge and redness.   Respiratory: Positive for shortness of breath. Negative for apnea, cough, choking, chest tightness, wheezing and stridor.    Cardiovascular: Positive for palpitations. Negative for chest pain and leg swelling.   Gastrointestinal: Positive for blood in stool. Negative for abdominal distention, abdominal pain, anal bleeding, constipation, diarrhea, nausea, rectal pain and vomiting.   Endocrine: Negative.    Musculoskeletal: Positive for neck stiffness. Negative for arthralgias, back pain, gait problem, joint swelling, myalgias and neck pain.   Skin: Negative.    Allergic/Immunologic: Positive for environmental allergies. Negative for food allergies and immunocompromised state.   Neurological: Positive for dizziness, light-headedness, numbness and headaches. Negative for tremors, seizures, syncope, facial asymmetry, speech difficulty and weakness.   Hematological: Negative.    Psychiatric/Behavioral: Negative.         I have reviewed ROS completed by medical assistant.     Objective:    Neurologic Exam  General appearance is normal. Mental  status showed normal orientation, memory, attention span and concentration, language, and fund of knowledge for age.    Cranial nerve exam- visual fields to OKN tape, funduscopy with examination of the optic disc and posterior segments of the eye, eye movements, pupillary reflexes, muscles of mastication, facial musculature, hearing, palatal movement, shoulder shrug and tongue protrusion were all normal.    Sensory examination was normal.  Deep tendon reflexes were 2+ and symmetric.    No pathologic reflexes were noted.  Cerebellar function was normal.  No focal weakness was noted.  No drift of outstretched arms.  Tone was normal.  Gait and station were normal.  No edema was noted.      Physical Exam    Assessment/Plan:     Heidi was seen today for migraine.    Diagnoses and all orders for this visit:    Intractable episodic headache, unspecified headache type  -     MRI Brain With & Without Contrast; Future    Other orders  -     amitriptyline (ELAVIL) 10 MG tablet; Take 1 tablet by mouth Every Night.         I am not convinced that there is a serious cause for her headaches but they are a bit atypical.  Headaches are also mixed in that they are chronic but have some mild migrainous symptoms.  She has responded to Maxalt in the past.  Neurologic examination is normal.  We will set up an MRI of the brain to exclude anything serious.  Also will place her on amitriptyline 10 mg every night.  Call for phone follow-up in 1 week.  Thank you for allowing me to share in the care of this patient.  Babar Rose M.D.

## 2019-07-10 ENCOUNTER — HOSPITAL ENCOUNTER (OUTPATIENT)
Dept: MRI IMAGING | Facility: HOSPITAL | Age: 39
Discharge: HOME | End: 2019-07-10
Admitting: PSYCHIATRY & NEUROLOGY

## 2019-07-10 DIAGNOSIS — R51.9 INTRACTABLE EPISODIC HEADACHE, UNSPECIFIED HEADACHE TYPE: ICD-10-CM

## 2019-07-10 PROCEDURE — 70553 MRI BRAIN STEM W/O & W/DYE: CPT

## 2019-07-10 PROCEDURE — A9577 INJ MULTIHANCE: HCPCS | Performed by: PSYCHIATRY & NEUROLOGY

## 2019-07-10 PROCEDURE — 0 GADOBENATE DIMEGLUMINE 529 MG/ML SOLUTION: Performed by: PSYCHIATRY & NEUROLOGY

## 2019-07-10 RX ADMIN — GADOBENATE DIMEGLUMINE 13 ML: 529 INJECTION, SOLUTION INTRAVENOUS at 14:22

## 2020-01-21 ENCOUNTER — HOSPITAL ENCOUNTER (OUTPATIENT)
Dept: CT IMAGING | Facility: HOSPITAL | Age: 40
Discharge: HOME | End: 2020-01-21
Admitting: OTOLARYNGOLOGY

## 2020-01-21 ENCOUNTER — TRANSCRIBE ORDERS (OUTPATIENT)
Dept: ADMINISTRATIVE | Facility: HOSPITAL | Age: 40
End: 2020-01-21

## 2020-01-21 DIAGNOSIS — J32.9 CHRONIC SINUSITIS, UNSPECIFIED LOCATION: Primary | ICD-10-CM

## 2020-01-21 DIAGNOSIS — J32.9 CHRONIC SINUSITIS, UNSPECIFIED LOCATION: ICD-10-CM

## 2020-01-21 PROCEDURE — 70486 CT MAXILLOFACIAL W/O DYE: CPT

## 2020-05-28 ENCOUNTER — HOSPITAL ENCOUNTER (OUTPATIENT)
Dept: GENERAL RADIOLOGY | Facility: HOSPITAL | Age: 40
Discharge: HOME | End: 2020-05-28

## 2020-05-28 ENCOUNTER — OFFICE VISIT (OUTPATIENT)
Dept: CARDIOLOGY | Facility: CLINIC | Age: 40
End: 2020-05-28

## 2020-05-28 ENCOUNTER — TRANSCRIBE ORDERS (OUTPATIENT)
Dept: ADMINISTRATIVE | Facility: HOSPITAL | Age: 40
End: 2020-05-28

## 2020-05-28 ENCOUNTER — HOSPITAL ENCOUNTER (OUTPATIENT)
Dept: CARDIOLOGY | Facility: HOSPITAL | Age: 40
Discharge: HOME | End: 2020-05-28
Admitting: SURGERY

## 2020-05-28 VITALS — HEIGHT: 65 IN | WEIGHT: 147 LBS | BODY MASS INDEX: 24.49 KG/M2

## 2020-05-28 DIAGNOSIS — R00.0 TACHYCARDIA: Primary | ICD-10-CM

## 2020-05-28 DIAGNOSIS — R05.9 COUGH: ICD-10-CM

## 2020-05-28 DIAGNOSIS — R07.2 PRECORDIAL PAIN: Primary | ICD-10-CM

## 2020-05-28 DIAGNOSIS — R07.9 CHEST PAIN, UNSPECIFIED TYPE: Primary | ICD-10-CM

## 2020-05-28 DIAGNOSIS — R00.2 PALPITATIONS: ICD-10-CM

## 2020-05-28 DIAGNOSIS — R07.2 PRECORDIAL PAIN: ICD-10-CM

## 2020-05-28 DIAGNOSIS — R07.9 CHEST PAIN, UNSPECIFIED TYPE: ICD-10-CM

## 2020-05-28 LAB
BH CV ECHO MEAS - ACS: 1.9 CM
BH CV ECHO MEAS - AO MAX PG (FULL): 1.8 MMHG
BH CV ECHO MEAS - AO MAX PG: 6.6 MMHG
BH CV ECHO MEAS - AO MEAN PG (FULL): 1 MMHG
BH CV ECHO MEAS - AO MEAN PG: 3.3 MMHG
BH CV ECHO MEAS - AO ROOT AREA (BSA CORRECTED): 1.6
BH CV ECHO MEAS - AO ROOT AREA: 5.3 CM^2
BH CV ECHO MEAS - AO ROOT DIAM: 2.6 CM
BH CV ECHO MEAS - AO V2 MAX: 128 CM/SEC
BH CV ECHO MEAS - AO V2 MEAN: 83.3 CM/SEC
BH CV ECHO MEAS - AO V2 VTI: 32.5 CM
BH CV ECHO MEAS - ASC AORTA: 2.9 CM
BH CV ECHO MEAS - AVA(I,A): 2.5 CM^2
BH CV ECHO MEAS - AVA(I,D): 2.5 CM^2
BH CV ECHO MEAS - AVA(V,A): 2.8 CM^2
BH CV ECHO MEAS - AVA(V,D): 2.8 CM^2
BH CV ECHO MEAS - BSA(HAYCOCK): 1.6 M^2
BH CV ECHO MEAS - BSA: 1.6 M^2
BH CV ECHO MEAS - BZI_BMI: 21.1 KILOGRAMS/M^2
BH CV ECHO MEAS - BZI_METRIC_HEIGHT: 165.1 CM
BH CV ECHO MEAS - BZI_METRIC_WEIGHT: 57.6 KG
BH CV ECHO MEAS - EDV(CUBED): 130.3 ML
BH CV ECHO MEAS - EDV(MOD-SP2): 71 ML
BH CV ECHO MEAS - EDV(MOD-SP4): 75 ML
BH CV ECHO MEAS - EDV(TEICH): 122.1 ML
BH CV ECHO MEAS - EF(CUBED): 80.2 %
BH CV ECHO MEAS - EF(MOD-BP): 68 %
BH CV ECHO MEAS - EF(MOD-SP2): 69 %
BH CV ECHO MEAS - EF(MOD-SP4): 65.3 %
BH CV ECHO MEAS - EF(TEICH): 72.4 %
BH CV ECHO MEAS - ESV(CUBED): 25.8 ML
BH CV ECHO MEAS - ESV(MOD-SP2): 22 ML
BH CV ECHO MEAS - ESV(MOD-SP4): 26 ML
BH CV ECHO MEAS - ESV(TEICH): 33.7 ML
BH CV ECHO MEAS - FS: 41.7 %
BH CV ECHO MEAS - IVS/LVPW: 1.1
BH CV ECHO MEAS - IVSD: 0.78 CM
BH CV ECHO MEAS - LAT PEAK E' VEL: 17 CM/SEC
BH CV ECHO MEAS - LV DIASTOLIC VOL/BSA (35-75): 46 ML/M^2
BH CV ECHO MEAS - LV MASS(C)D: 127 GRAMS
BH CV ECHO MEAS - LV MASS(C)DI: 77.8 GRAMS/M^2
BH CV ECHO MEAS - LV MAX PG: 4.8 MMHG
BH CV ECHO MEAS - LV MEAN PG: 2.3 MMHG
BH CV ECHO MEAS - LV SYSTOLIC VOL/BSA (12-30): 15.9 ML/M^2
BH CV ECHO MEAS - LV V1 MAX: 109.4 CM/SEC
BH CV ECHO MEAS - LV V1 MEAN: 68.3 CM/SEC
BH CV ECHO MEAS - LV V1 VTI: 24.8 CM
BH CV ECHO MEAS - LVIDD: 5.1 CM
BH CV ECHO MEAS - LVIDS: 3 CM
BH CV ECHO MEAS - LVLD AP2: 7.1 CM
BH CV ECHO MEAS - LVLD AP4: 7.7 CM
BH CV ECHO MEAS - LVLS AP2: 5.9 CM
BH CV ECHO MEAS - LVLS AP4: 5.8 CM
BH CV ECHO MEAS - LVOT AREA (M): 3.1 CM^2
BH CV ECHO MEAS - LVOT AREA: 3.2 CM^2
BH CV ECHO MEAS - LVOT DIAM: 2 CM
BH CV ECHO MEAS - LVPWD: 0.71 CM
BH CV ECHO MEAS - MED PEAK E' VEL: 10 CM/SEC
BH CV ECHO MEAS - MV A DUR: 0.11 SEC
BH CV ECHO MEAS - MV A MAX VEL: 76.6 CM/SEC
BH CV ECHO MEAS - MV DEC SLOPE: 297.5 CM/SEC^2
BH CV ECHO MEAS - MV DEC TIME: 129 SEC
BH CV ECHO MEAS - MV E MAX VEL: 85.9 CM/SEC
BH CV ECHO MEAS - MV E/A: 1.1
BH CV ECHO MEAS - MV MAX PG: 3.5 MMHG
BH CV ECHO MEAS - MV MEAN PG: 1.2 MMHG
BH CV ECHO MEAS - MV P1/2T MAX VEL: 90.7 CM/SEC
BH CV ECHO MEAS - MV P1/2T: 89.3 MSEC
BH CV ECHO MEAS - MV V2 MAX: 93.4 CM/SEC
BH CV ECHO MEAS - MV V2 MEAN: 50 CM/SEC
BH CV ECHO MEAS - MV V2 VTI: 28.4 CM
BH CV ECHO MEAS - MVA P1/2T LCG: 2.4 CM^2
BH CV ECHO MEAS - MVA(P1/2T): 2.5 CM^2
BH CV ECHO MEAS - MVA(VTI): 2.8 CM^2
BH CV ECHO MEAS - PA ACC TIME: 0.12 SEC
BH CV ECHO MEAS - PA MAX PG (FULL): 0.09 MMHG
BH CV ECHO MEAS - PA MAX PG: 2.6 MMHG
BH CV ECHO MEAS - PA PR(ACCEL): 26.7 MMHG
BH CV ECHO MEAS - PA V2 MAX: 80.9 CM/SEC
BH CV ECHO MEAS - PULM A REVS DUR: 0.13 SEC
BH CV ECHO MEAS - PULM A REVS VEL: 25.7 CM/SEC
BH CV ECHO MEAS - PULM DIAS VEL: 47.5 CM/SEC
BH CV ECHO MEAS - PULM S/D: 1
BH CV ECHO MEAS - PULM SYS VEL: 48.5 CM/SEC
BH CV ECHO MEAS - PVA(V,A): 3.6 CM^2
BH CV ECHO MEAS - PVA(V,D): 3.6 CM^2
BH CV ECHO MEAS - QP/QS: 0.86
BH CV ECHO MEAS - RAP SYSTOLE: 3 MMHG
BH CV ECHO MEAS - RV BASE (<4.1) - OBSOLETE: 2.9 CM
BH CV ECHO MEAS - RV LENGTH (<8.5) - OBSOLETE: 6.6 CM
BH CV ECHO MEAS - RV MAX PG: 2.5 MMHG
BH CV ECHO MEAS - RV MEAN PG: 1.5 MMHG
BH CV ECHO MEAS - RV V1 MAX: 79.5 CM/SEC
BH CV ECHO MEAS - RV V1 MEAN: 57.3 CM/SEC
BH CV ECHO MEAS - RV V1 VTI: 18.8 CM
BH CV ECHO MEAS - RVOT AREA: 3.6 CM^2
BH CV ECHO MEAS - RVOT DIAM: 2.2 CM
BH CV ECHO MEAS - SI(AO): 106.1 ML/M^2
BH CV ECHO MEAS - SI(CUBED): 64.1 ML/M^2
BH CV ECHO MEAS - SI(LVOT): 48.9 ML/M^2
BH CV ECHO MEAS - SI(MOD-SP2): 30 ML/M^2
BH CV ECHO MEAS - SI(MOD-SP4): 30 ML/M^2
BH CV ECHO MEAS - SI(TEICH): 54.2 ML/M^2
BH CV ECHO MEAS - SV(AO): 173 ML
BH CV ECHO MEAS - SV(CUBED): 104.6 ML
BH CV ECHO MEAS - SV(LVOT): 79.8 ML
BH CV ECHO MEAS - SV(MOD-SP2): 49 ML
BH CV ECHO MEAS - SV(MOD-SP4): 49 ML
BH CV ECHO MEAS - SV(RVOT): 68.3 ML
BH CV ECHO MEAS - SV(TEICH): 88.4 ML
BH CV ECHO MEAS - TAPSE (>1.6): 3.6 CM2
BH CV ECHO MEASUREMENTS AVERAGE E/E' RATIO: 6.36
BH CV VAS BP RIGHT ARM: NORMAL MMHG
BH CV XLRA - RV BASE: 2.9 CM
BH CV XLRA - RV LENGTH: 6.6 CM
BH CV XLRA - RV MID: 3.3 CM
BH CV XLRA - TDI S': 16 CM/SEC
LEFT ATRIUM VOLUME INDEX: 22 ML/M2
MAXIMAL PREDICTED HEART RATE: 181 BPM
STRESS TARGET HR: 154 BPM

## 2020-05-28 PROCEDURE — 99214 OFFICE O/P EST MOD 30 MIN: CPT | Performed by: NURSE PRACTITIONER

## 2020-05-28 PROCEDURE — 93306 TTE W/DOPPLER COMPLETE: CPT | Performed by: INTERNAL MEDICINE

## 2020-05-28 PROCEDURE — 93306 TTE W/DOPPLER COMPLETE: CPT

## 2020-05-28 PROCEDURE — 71046 X-RAY EXAM CHEST 2 VIEWS: CPT

## 2020-05-28 PROCEDURE — 93010 ELECTROCARDIOGRAM REPORT: CPT | Performed by: INTERNAL MEDICINE

## 2020-05-28 PROCEDURE — 93005 ELECTROCARDIOGRAM TRACING: CPT | Performed by: SURGERY

## 2020-05-28 RX ORDER — METHYLPREDNISOLONE 4 MG/1
4 TABLET ORAL DAILY
COMMUNITY
End: 2020-05-28

## 2020-05-28 NOTE — PROGRESS NOTES
Telehealth Visit    Date of Visit: 2020  Encounter Provider: MAMIE Torres  Place of Service: [facility] CARDIOLOGY  Patient Name: Heidi Sawant  :1980  Primary Cardiologist: Dr. Kelvin Feliz     Chief Complaint   Patient presents with   • Chest Pain   • Palpitations       HPI: Heidi Sawant is a pleasant 40 y.o. female who is an established patient of our practice. Due to COVID-19 virus, I am conducting a telehealth visit via video with patient and she has consented to this visit today. She is a new patient to me and her previous records have been reviewed.    In college, she reported a few episodes of syncope.  She would become lightheaded, warm, and lose consciousness for a few seconds.  Afterwards she would feel fine.    In May 2019, she was evaluated in the office by Dr. Kelvin Feliz.  She explains that on this particular day she had gone to hot yoga 2 days in a row, showed a house that reeked of cigarette smoke, drink coffee mostly that day and not much water, and then went to a friend's house and had a few alcoholic beverages which she does not do frequently.  That night she woke up lightheaded and heart was beating fast and irregular.  She got up to use the restroom and was nauseated and pale.  She continued to have some intermittent palpitations and episodes of lightheadedness.  She then had a peanut butter sandwich for lunch which is common for her and developed tongue swelling, throat/tongue tingling, chest pain, and dyspnea.  She took diphenhydramine and it passed.    Dr. Feliz felt that her syncopal episodes were vasovagal that occurred in college.  She suspected the event that occurred in the middle of the night was also vagal and brought on by extremely busy few days and dehydration.  An echocardiogram was obtained on 2019 which was normal.  She recommended more water and less caffeine.  She was encouraged to look into the Zoyi liz for her  Isaiah if she were to have another tachycardic episode.  She recommended an allergist for this possible peanut allergy.    They were conducting a telehealth video visit due to new symptoms.  She explains in March 2020 she was diagnosed with a URI and had symptoms for 2 weeks.  She is started Symbicort and has been prescribed albuterol which she has not really taken.  She wonders if she had COVID-19 virus back in March and her recent antibody test was negative.  Since May 19 she has noticed a cough at nighttime and sharp chest discomfort.  She says lying down makes the pain more uncomfortable and at times it radiated to her ribs, back, and shoulder.  She has had some mild discomfort during the daytime, but is not constant.  On 5/18 she reported her heart racing.  She was concerned about the possibility of pericarditis and has been taking ibuprofen 400 mg every 6 hours.  At times her symptoms have improved, but over the past 2 days her chest pain at nighttime has worsened.  She started an old Medrol dose pack today.    Past Medical History:   Diagnosis Date   • Colon polyps    • Migraine    • Vasovagal syncope     college       Past Surgical History:   Procedure Laterality Date   • COLONOSCOPY W/ POLYPECTOMY     • VAGINAL DELIVERY         Social History     Socioeconomic History   • Marital status:      Spouse name: Not on file   • Number of children: Not on file   • Years of education: Not on file   • Highest education level: Not on file   Tobacco Use   • Smoking status: Never Smoker   • Smokeless tobacco: Never Used   Substance and Sexual Activity   • Alcohol use: Yes     Comment: social//Caffeine use: decaf daily   • Drug use: No   • Sexual activity: Yes     Partners: Male       Family History   Problem Relation Age of Onset   • Migraines Mother    • Aneurysm Maternal Grandmother    • Heart attack Maternal Grandfather    • Hypertension Maternal Grandfather    • No Known Problems Father    • No Known Problems  "Brother    • Colon cancer Paternal Grandfather    • Breast cancer Paternal Grandmother        The following portion of the patient's history were reviewed and updated as appropriate: past medical history, past surgical history, past social history, past family history, allergies, current medications, and problem list.    Review of Systems   Constitution: Negative.   Cardiovascular: Positive for chest pain and palpitations.   Respiratory: Positive for cough.    Hematologic/Lymphatic: Negative for bleeding problem.       Allergies   Allergen Reactions   • Penicillins Other (See Comments)     Father and grandmother allergic, patient passed out from penicillin injection         Current Outpatient Medications:   •  albuterol sulfate  (90 Base) MCG/ACT inhaler, Inhale 2 puffs by mouth every 4 hours as needed., Disp: 18 g, Rfl: 3  •  amitriptyline (ELAVIL) 10 MG tablet, Take 1 tablet by mouth Every Night. (Patient taking differently: Take 10 mg by mouth At Night As Needed.), Disp: 30 tablet, Rfl: 5  •  budesonide-formoterol (SYMBICORT) 160-4.5 MCG/ACT inhaler, Inhale 2 puffs 2 (Two) Times a Day., Disp: 10.2 g, Rfl: 3  •  cetirizine (zyrTEC) 10 MG tablet, Take 10 mg by mouth Daily., Disp: , Rfl:   •  methylPREDNISolone (Medrol) 4 MG tablet, Take 4 mg by mouth Daily., Disp: , Rfl:   •  Multiple Vitamin (MULTI VITAMIN DAILY PO), Take 1 tablet by mouth Daily., Disp: , Rfl:         Objective:     Vitals:    05/28/20 1429   Weight: 66.7 kg (147 lb)   Height: 165.1 cm (65\")     Body mass index is 24.46 kg/m².    Due to telehealth visit, there was no EKG, vitals, or weight performed in our office.  Vitals/Weight were reported by the patient and conducted at home.     PHYSICAL EXAM:    Vitals Reviewed  General Appearance: No acute distress, well developed and well nourished.   Eyes: Conjunctivae and lids: No erythema, swelling, or discharge. Sclerae anicteric.   HENT: Atraumatic, normocephalic. External eyes, ears, and nose " normal. No hearing loss noted. Mucous membranes normal. Lips not cyanotic.   Neck: Symmetrical and no evidence of mass.  Respiratory: No signs of respiratory distress. Respiration rhythm and depth normal.   Musculoskeletal: Normal movement of extremities.  Skin: General appearance normal. No pallor, cyanosis, diaphoresis.   Psychiatric: Patient alert and oriented to person, place, and time. Speech and behavior appropriate. Normal mood and affect.        Assessment:       Diagnosis Plan   1. Precordial pain  XR Chest PA & Lateral   2. Cough  XR Chest PA & Lateral   3. Palpitations            Plan:       She reports her recent URI in March 2020.  She is now experiencing coughing at nighttime and sharp chest discomfort and occasional palpitations.  I discussed her plan of care with Dr. Kelvin Feliz.  She recommended a limited echocardiogram in the morning to evaluate for pericarditis/pericardial effusion, EKG, and a chest x-ray.  She recommended stopping the Medrol Dosepak at this time and taking ibuprofen 800 mg 3 times daily with food and keeping herself well-hydrated.  I explained the recommendations to the patient and she said she is actually on her way to the hospital now for an echocardiogram.  She will also have the EKG and chest x-ray completed there.  Dr. Feliz notified that echocardiogram will be completed tonight.    This patient has consented to a telehealth visit via video. The visit was scheduled as a video visit to comply with patient safety concerns in accordance with CDC recommendations.  All vitals recorded within this visit are reported by the patient.  I spent 25 minutes in total including but not limited to the 16 minutes spent in direct conversation with this patient.      As always, it has been a pleasure to participate in your patient's care. Thank you.       Sincerely,         MAMIE Wells        Dictated utilizing Dragon dictation

## 2020-05-29 ENCOUNTER — TELEPHONE (OUTPATIENT)
Dept: CARDIOLOGY | Facility: CLINIC | Age: 40
End: 2020-05-29

## 2020-05-29 NOTE — TELEPHONE ENCOUNTER
CXR Results:     · The lungs are well-expanded and clear and the heart and hilar structures are normal. There is no acute disease or change from 04/24/2018.      Echocardiogram Results:    · Left ventricular systolic function is normal. Calculated EF = 68.0%.  · Left ventricular diastolic function is normal  · Normal right ventricular cavity size and systolic function noted.  · There is no evidence of pericardial effusion.      EKG showed sinus bradycardia.     Patient informed about test results.  She will continue with ibuprofen 800 mg 3 times x5 days with food and will drink plenty of water.  I asked her to call with any worsening symptoms.  I will conduct a telehealth visit with her next Friday at 10:30 AM.  The plan of care was discussed with Dr. Kelvin Feliz and she agrees.

## 2020-06-05 ENCOUNTER — OFFICE VISIT (OUTPATIENT)
Dept: CARDIOLOGY | Facility: CLINIC | Age: 40
End: 2020-06-05

## 2020-06-05 VITALS — BODY MASS INDEX: 24.49 KG/M2 | WEIGHT: 147 LBS | HEIGHT: 65 IN

## 2020-06-05 DIAGNOSIS — R00.2 PALPITATIONS: ICD-10-CM

## 2020-06-05 DIAGNOSIS — R07.2 PRECORDIAL PAIN: Primary | ICD-10-CM

## 2020-06-05 PROCEDURE — 99213 OFFICE O/P EST LOW 20 MIN: CPT | Performed by: NURSE PRACTITIONER

## 2020-06-05 NOTE — PROGRESS NOTES
Telehealth Visit    Date of Visit: 2020  Encounter Provider: MAMIE Torres  Place of Service: [facility] CARDIOLOGY  Patient Name: Heidi Sawant  :1980  Primary Cardiologist: Dr. Kelvin Feliz     Chief Complaint   Patient presents with   • Palpitations   • Chest Pain   • Follow-up       HPI: Heidi Sawant is a pleasant 40 y.o. female who is an established patient of our practice. Due to COVID-19 virus, I am conducting a telehealth visit via video with patient and she has consented to this visit today.     In college, she reported a few episodes of syncope.  She would become lightheaded, warm, and lose consciousness for a few seconds.  Afterwards she would feel fine.    In May 2019, she was evaluated in the office by Dr. Kelvin Feliz.  She explains that on this particular day she had gone to hot yoga 2 days in a row, showed a house that reeked of cigarette smoke, drink coffee mostly that day and not much water, and then went to a friend's house and had a few alcoholic beverages which she does not do frequently.  That night she woke up lightheaded and heart was beating fast and irregular.  She got up to use the restroom and was nauseated and pale.  She continued to have some intermittent palpitations and episodes of lightheadedness.  She then had a peanut butter sandwich for lunch which is common for her and developed tongue swelling, throat/tongue tingling, chest pain, and dyspnea.  She took diphenhydramine and it passed.    Dr. Feliz felt that her syncopal episodes were vasovagal that occurred in college.  She suspected the event that occurred in the middle of the night was also vagal and brought on by extremely busy few days and dehydration.  An echocardiogram was obtained on 2019 which was normal.  She recommended more water and less caffeine.  She was encouraged to look into the NGRAIN liz for her [place] if she were to have another tachycardic episode.  She  recommended an allergist for this possible peanut allergy.    I recently conducted a telehealth visit with her.  She explained that she had had an upper respiratory infection in March and was not feeling well for a couple of weeks.  She was started on Symbicort.  She had a COVID-19 test which was negative.  When I talked with her she was concerned about a cough at nighttime, chest pain, and occasional palpitations.  An echocardiogram was completed at the hospital which showed normal ejection fraction and no evidence of pericardial effusion, chest x-ray normal, and EKG showed sinus bradycardia.  We treated her with ibuprofen 800 mg 3 times a day.    Today is her follow-up video visit.  She says overall she is feeling better and her chest pain resolved 3 days ago.  She said this morning she had 2 seconds of chest pain, but it has also resolved.  She never took the high-dose ibuprofen and was taking ibuprofen 400 mg twice per day.  She denies shortness of breath, palpitations, edema, dizziness, or syncope.      Past Medical History:   Diagnosis Date   • Colon polyps    • Migraine    • Vasovagal syncope     college       Past Surgical History:   Procedure Laterality Date   • COLONOSCOPY W/ POLYPECTOMY     • VAGINAL DELIVERY         Social History     Socioeconomic History   • Marital status:      Spouse name: Not on file   • Number of children: Not on file   • Years of education: Not on file   • Highest education level: Not on file   Tobacco Use   • Smoking status: Never Smoker   • Smokeless tobacco: Never Used   Substance and Sexual Activity   • Alcohol use: Yes     Comment: social//Caffeine use: decaf daily   • Drug use: No   • Sexual activity: Yes     Partners: Male       Family History   Problem Relation Age of Onset   • Migraines Mother    • Aneurysm Maternal Grandmother    • Heart attack Maternal Grandfather    • Hypertension Maternal Grandfather    • No Known Problems Father    • No Known Problems Brother   "  • Colon cancer Paternal Grandfather    • Breast cancer Paternal Grandmother        The following portion of the patient's history were reviewed and updated as appropriate: past medical history, past surgical history, past social history, past family history, allergies, current medications, and problem list.    Review of Systems   Constitution: Negative.   Cardiovascular: Positive for chest pain and palpitations.   Respiratory: Positive for cough.    Hematologic/Lymphatic: Negative for bleeding problem.       Allergies   Allergen Reactions   • Penicillins Other (See Comments)     Father and grandmother allergic, patient passed out from penicillin injection         Current Outpatient Medications:   •  albuterol sulfate  (90 Base) MCG/ACT inhaler, Inhale 2 puffs by mouth every 4 hours as needed., Disp: 18 g, Rfl: 3  •  amitriptyline (ELAVIL) 10 MG tablet, Take 1 tablet by mouth Every Night. (Patient taking differently: Take 10 mg by mouth At Night As Needed.), Disp: 30 tablet, Rfl: 5  •  budesonide-formoterol (SYMBICORT) 160-4.5 MCG/ACT inhaler, Inhale 2 puffs 2 (Two) Times a Day., Disp: 10.2 g, Rfl: 3  •  cetirizine (zyrTEC) 10 MG tablet, Take 10 mg by mouth Daily., Disp: , Rfl:   •  Multiple Vitamin (MULTI VITAMIN DAILY PO), Take 1 tablet by mouth Daily., Disp: , Rfl:         Objective:     Vitals:    06/05/20 1056   Weight: 66.7 kg (147 lb)   Height: 165.1 cm (65\")     Body mass index is 24.46 kg/m².    Due to telehealth visit, there was no EKG, vitals, or weight performed in our office.  Vitals/Weight were reported by the patient and conducted at home.     PHYSICAL EXAM:    Vitals Reviewed  General Appearance: No acute distress, well developed and well nourished.   Eyes: Conjunctivae and lids: No erythema, swelling, or discharge. Sclerae anicteric.   HENT: Atraumatic, normocephalic. External eyes, ears, and nose normal. No hearing loss noted. Mucous membranes normal. Lips not cyanotic.   Neck: Symmetrical " and no evidence of mass.  Respiratory: No signs of respiratory distress. Respiration rhythm and depth normal.   Musculoskeletal: Normal movement of extremities.  Skin: General appearance normal. No pallor, cyanosis, diaphoresis.   Psychiatric: Patient alert and oriented to person, place, and time. Speech and behavior appropriate. Normal mood and affect.        Assessment:       Diagnosis Plan   1. Precordial pain     2. Palpitations            Plan:       She was recently experiencing a sharp chest discomforts that worsens at nighttime.  Echocardiogram showed no evidence of pericardial effusion, chest x-ray normal, and EKG showed sinus bradycardia.  She took ibuprofen 400 mg twice per day for about 7 days and her symptoms have almost resolved.  She had a couple seconds of chest pain this morning.  I told her she could continue with 2 or 400 mg of ibuprofen twice a day for the next couple days and then stop it completely.  If she has any further chest pain or palpitations of concern, I have asked her to call the office.  Otherwise we can see her on an as-needed basis.    This patient has consented to a telehealth visit via video. The visit was scheduled as a video visit to comply with patient safety concerns in accordance with CDC recommendations.  All vitals recorded within this visit are reported by the patient.  I spent 20 minutes in total including but not limited to the 6 minutes spent in direct conversation with this patient.      As always, it has been a pleasure to participate in your patient's care. Thank you.       Sincerely,         MAMIE Wells        Dictated utilizing Dragon dictation